# Patient Record
Sex: MALE | Race: WHITE
[De-identification: names, ages, dates, MRNs, and addresses within clinical notes are randomized per-mention and may not be internally consistent; named-entity substitution may affect disease eponyms.]

---

## 2018-12-08 ENCOUNTER — HOSPITAL ENCOUNTER (EMERGENCY)
Dept: HOSPITAL 8 - ED | Age: 33
Discharge: HOME | End: 2018-12-08
Payer: SELF-PAY

## 2018-12-08 VITALS — SYSTOLIC BLOOD PRESSURE: 133 MMHG | DIASTOLIC BLOOD PRESSURE: 82 MMHG

## 2018-12-08 VITALS — HEIGHT: 67 IN | WEIGHT: 145.51 LBS | BODY MASS INDEX: 22.84 KG/M2

## 2018-12-08 DIAGNOSIS — K08.89: Primary | ICD-10-CM

## 2018-12-08 PROCEDURE — 99283 EMERGENCY DEPT VISIT LOW MDM: CPT

## 2019-04-02 ENCOUNTER — OFFICE VISIT (OUTPATIENT)
Dept: URGENT CARE | Facility: CLINIC | Age: 34
End: 2019-04-02
Payer: MEDICAID

## 2019-04-02 VITALS
RESPIRATION RATE: 16 BRPM | BODY MASS INDEX: 25.69 KG/M2 | SYSTOLIC BLOOD PRESSURE: 124 MMHG | HEART RATE: 104 BPM | WEIGHT: 145 LBS | HEIGHT: 63 IN | DIASTOLIC BLOOD PRESSURE: 70 MMHG | OXYGEN SATURATION: 95 % | TEMPERATURE: 98.7 F

## 2019-04-02 DIAGNOSIS — L08.9 FINGER INFECTION: ICD-10-CM

## 2019-04-02 PROCEDURE — 99203 OFFICE O/P NEW LOW 30 MIN: CPT | Mod: 25 | Performed by: PHYSICIAN ASSISTANT

## 2019-04-02 PROCEDURE — 90471 IMMUNIZATION ADMIN: CPT | Performed by: PHYSICIAN ASSISTANT

## 2019-04-02 PROCEDURE — 90715 TDAP VACCINE 7 YRS/> IM: CPT | Performed by: PHYSICIAN ASSISTANT

## 2019-04-02 RX ORDER — AMOXICILLIN AND CLAVULANATE POTASSIUM 875; 125 MG/1; MG/1
1 TABLET, FILM COATED ORAL 2 TIMES DAILY
Qty: 14 TAB | Refills: 0 | Status: SHIPPED | OUTPATIENT
Start: 2019-04-02 | End: 2019-04-09

## 2019-04-02 ASSESSMENT — ENCOUNTER SYMPTOMS
DIARRHEA: 0
JOINT SWELLING: 1
VOMITING: 0
TINGLING: 0
CHILLS: 0
BACK PAIN: 0
FALLS: 0
COUGH: 0
FEVER: 0

## 2019-04-03 NOTE — PROGRESS NOTES
"Subjective:      Flynn Brown is a 33 y.o. male who presents with Wound Infection ((R) thumb & Middle finger x 1 week)            Patient is a 33-year-old male who presents to urgent care with right thumb drainage and pain over the last week.  Patient is uncertain if he \"banged his finger \"on something but admits that he was slightly sore adjacent to the nail and began today around the nail as he felt it was ingrown.  The following day he developed drainage and felt as though the nail was up lifting.  He does report applying pressure and reports notable discharge from the area.  He denies any fevers, chills.  Patient is uncertain when his last tetanus was.      Wound Infection   This is a new problem. The current episode started in the past 7 days. The problem has been gradually worsening. Associated symptoms include joint swelling. Pertinent negatives include no chills, congestion, coughing, fever, rash or vomiting. Exacerbated by: Pressure over the area. Treatments tried: Topical Neosporin.   Of note patient denies prior history of diabetes.    Review of Systems   Constitutional: Negative for chills and fever.   HENT: Negative for congestion.    Respiratory: Negative for cough.    Gastrointestinal: Negative for diarrhea and vomiting.   Musculoskeletal: Positive for joint pain and joint swelling. Negative for back pain and falls.   Skin: Negative for rash.   Neurological: Negative for tingling.   All other systems reviewed and are negative.         Objective:     /70 (BP Location: Left arm, Patient Position: Sitting, BP Cuff Size: Adult)   Pulse (!) 104   Temp 37.1 °C (98.7 °F) (Temporal)   Resp 16   Ht 1.6 m (5' 3\")   Wt 65.8 kg (145 lb)   SpO2 95%   BMI 25.69 kg/m²    PMH:  has no past medical history on file.  MEDS:   Current Outpatient Prescriptions:   •  amoxicillin-clavulanate (AUGMENTIN) 875-125 MG Tab, Take 1 Tab by mouth 2 times a day for 7 days., Disp: 14 Tab, Rfl: 0  ALLERGIES: No " Known Allergies  SURGHX: No past surgical history on file.  SOCHX:    FH: Family history was reviewed, no pertinent findings to report    Physical Exam   Constitutional: He is oriented to person, place, and time. He appears well-developed and well-nourished. No distress.   HENT:   Head: Normocephalic and atraumatic.   Eyes: Pupils are equal, round, and reactive to light. Conjunctivae and EOM are normal.   Neck: Normal range of motion. Neck supple. No tracheal deviation present.   Cardiovascular: Normal rate.    Pulmonary/Chest: Effort normal. No respiratory distress.   Musculoskeletal:        Hands:  Diffuse erythema to the lateral aspect of the right thumb-without notable discharge.  Nail is slightly ingrown.  Appears to have small granuloma at the base of the nail.  Full range of motion of the digit.  Neurovascularly intact distally.   Neurological: He is alert and oriented to person, place, and time. Coordination normal.   Skin: Skin is warm. No rash noted.   Psychiatric: He has a normal mood and affect. His behavior is normal. Judgment and thought content normal.   Vitals reviewed.              Assessment/Plan:     1. Finger infection  - amoxicillin-clavulanate (AUGMENTIN) 875-125 MG Tab; Take 1 Tab by mouth 2 times a day for 7 days.  Dispense: 14 Tab; Refill: 0  - Tdap =>6yo IM    No active paronychia however suspect this was the case a few days ago.  Patient had subsequent drainage from the area.  Continued warm hot compresses and soaks.  We will start the patient on Augmentin and will update patient's Tdap.  Patient given precautionary s/sx that mandate immediate follow up and evaluation in the ED. Advised of risks of not doing so.    DDX, Supportive care, and indications for immediate follow-up discussed with patient.    Instructed to return to clinic or nearest emergency department if we are not available for any change in condition, further concerns, or worsening of symptoms.    The patient demonstrated a  good understanding and agreed with the treatment plan.  Please note that this dictation was created using voice recognition software. I have made every reasonable attempt to correct obvious errors, but I expect that there are errors of grammar and possibly content that I did not discover before finalizing the note.

## 2020-09-06 ENCOUNTER — HOSPITAL ENCOUNTER (EMERGENCY)
Dept: HOSPITAL 8 - ED | Age: 35
LOS: 1 days | Discharge: HOME | End: 2020-09-07
Payer: MEDICAID

## 2020-09-06 VITALS — SYSTOLIC BLOOD PRESSURE: 128 MMHG | DIASTOLIC BLOOD PRESSURE: 84 MMHG

## 2020-09-06 VITALS — HEIGHT: 63 IN | BODY MASS INDEX: 25.78 KG/M2 | WEIGHT: 145.51 LBS
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
## 2020-09-06 DIAGNOSIS — S63.522A: ICD-10-CM

## 2020-09-06 DIAGNOSIS — S00.91XA: ICD-10-CM

## 2020-09-06 DIAGNOSIS — S63.521A: ICD-10-CM
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
## 2020-09-06 DIAGNOSIS — S02.40EA: Primary | ICD-10-CM
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
## 2020-09-06 PROCEDURE — 29125 APPL SHORT ARM SPLINT STATIC: CPT

## 2020-09-06 PROCEDURE — 99284 EMERGENCY DEPT VISIT MOD MDM: CPT

## 2020-09-06 PROCEDURE — 70486 CT MAXILLOFACIAL W/O DYE: CPT

## 2025-06-12 ENCOUNTER — HOSPITAL ENCOUNTER (EMERGENCY)
Facility: MEDICAL CENTER | Age: 40
End: 2025-06-12
Attending: EMERGENCY MEDICINE
Payer: COMMERCIAL

## 2025-06-12 VITALS
HEIGHT: 63 IN | BODY MASS INDEX: 31.54 KG/M2 | OXYGEN SATURATION: 96 % | RESPIRATION RATE: 16 BRPM | TEMPERATURE: 99 F | SYSTOLIC BLOOD PRESSURE: 141 MMHG | WEIGHT: 178 LBS | HEART RATE: 105 BPM | DIASTOLIC BLOOD PRESSURE: 106 MMHG

## 2025-06-12 DIAGNOSIS — F15.10 METHAMPHETAMINE USE (HCC): Primary | ICD-10-CM

## 2025-06-12 PROCEDURE — 99283 EMERGENCY DEPT VISIT LOW MDM: CPT

## 2025-06-12 PROCEDURE — A9270 NON-COVERED ITEM OR SERVICE: HCPCS | Mod: UD | Performed by: EMERGENCY MEDICINE

## 2025-06-12 PROCEDURE — 700102 HCHG RX REV CODE 250 W/ 637 OVERRIDE(OP): Mod: UD | Performed by: EMERGENCY MEDICINE

## 2025-06-12 RX ORDER — LORAZEPAM 1 MG/1
0.5 TABLET ORAL ONCE
Status: COMPLETED | OUTPATIENT
Start: 2025-06-12 | End: 2025-06-12

## 2025-06-12 RX ADMIN — LORAZEPAM 0.5 MG: 1 TABLET ORAL at 18:41

## 2025-06-12 ASSESSMENT — COGNITIVE AND FUNCTIONAL STATUS - GENERAL
DAILY ACTIVITIY SCORE: 24
MOBILITY SCORE: 24
SUGGESTED CMS G CODE MODIFIER MOBILITY: CH
SUGGESTED CMS G CODE MODIFIER DAILY ACTIVITY: CH

## 2025-06-13 NOTE — ED TRIAGE NOTES
Chief Complaint   Patient presents with    Medical Clearance     BIB PD for medical clearance. Pt is at [facility] and has been sober for 6 months, used meth earlier today and needs clearance to get back to facility. Denies any other medical complaints.

## 2025-06-13 NOTE — ED PROVIDER NOTES
"ED Provider Note    CHIEF COMPLAINT  Chief Complaint   Patient presents with    Medical Clearance     BIB PD for medical clearance. Pt is at [facility] and has been sober for 6 months, used meth earlier today and needs clearance to get back to facility. Denies any other medical complaints.        EXTERNAL RECORDS REVIEWED  No pertinent history in chart reviewOther      HPI/ROS  LIMITATION TO HISTORY   Select: : None  OUTSIDE HISTORIAN(S):  Law Enforcement at bedside    Flynn Brown is a 39 y.o. male who presents to the emergency department for medical clearance.  Patient brought in by law enforcement.  Patient had been sober from amphetamine use for 6 months.  Today relapse after coming into some acquaintances and feeling somewhat stressed.  States that he took a few hits of meth.  Feels well and denies any other substance abuse.  Now regretful of his actions earlier today.  In order to be accepted back into [facility] facility he needed medical clearance.  Now here for that with no other acute emergent complaint    PAST MEDICAL HISTORY       SURGICAL HISTORY  patient denies any surgical history    FAMILY HISTORY  History reviewed. No pertinent family history.    SOCIAL HISTORY  Social History     Tobacco Use    Smoking status: Former     Types: Cigarettes    Smokeless tobacco: Never   Substance and Sexual Activity    Alcohol use: Not Currently    Drug use: Yes     Comment: meth    Sexual activity: Not on file       CURRENT MEDICATIONS  Home Medications       Reviewed by Brandy Sanchez R.N. (Registered Nurse) on 06/12/25 at 3904  Med List Status: Partial     Medication Last Dose Status        Patient Jamir Taking any Medications                           ALLERGIES  Allergies[1]    PHYSICAL EXAM  VITAL SIGNS: BP (!) 141/106   Pulse (!) 105   Temp 37.2 °C (99 °F) (Temporal)   Resp 16   Ht 1.6 m (5' 3\")   Wt 80.7 kg (178 lb)   SpO2 96%   BMI 31.53 kg/m²      Pulse ox interpretation: I " interpret this pulse ox as normal.  Constitutional: Alert in no apparent distress.  HENT: No signs of trauma, Bilateral external ears normal, Nose normal.   Eyes: Pupils are equal and reactive  Neck: Normal range of motion, No tenderness, Supple  Cardiovascular: sl tachy, Regular rate and rhythm, no murmurs.   Thorax & Lungs: Normal breath sounds, No respiratory distress  Skin: Warm, Dry  Musculoskeletal: Good range of motion in all major joints.  Neurologic: Alert , Normal motor function, Normal sensory function, No focal deficits noted.   Psychiatric: Affect normal, Judgment normal, Mood normal.         COURSE & MEDICAL DECISION MAKING    ASSESSMENT, COURSE AND PLAN  Care Narrative: 39-year-old male presenting for medical clearance evaluation.  At this point I do not find any reason to escalate any additional ER workup or hospitalization.  Patient will be provided with a single dose of oral Ativan prior to discharge.  Released to law enforcement    DISPOSITION AND DISCUSSIONS  I have discussed management of the patient with the following physicians and DAVID's:      39-year-old male presenting for medical clearance.  Patient medically cleared.  Will discharge after Ativan provided.  Patient can have continuation of care at group home.  Understanding that he should continue to abstain from illicit substances.    FINAL DIAGNOSIS  1. Methamphetamine use (HCC)         Electronically signed by: Joby Motta M.D., 6/12/2025 6:37 PM           [1] No Known Allergies

## 2025-06-13 NOTE — ED NOTES
Patient provided discharge instructions and verbalizes understanding. Denies any further questions. Patient instructed to return if condition worsens. No distress noted. Patient discharged in stable condition to PD with all belongings.